# Patient Record
Sex: MALE | Race: WHITE | Employment: FULL TIME | URBAN - METROPOLITAN AREA
[De-identification: names, ages, dates, MRNs, and addresses within clinical notes are randomized per-mention and may not be internally consistent; named-entity substitution may affect disease eponyms.]

---

## 2017-06-19 ENCOUNTER — OFFICE VISIT (OUTPATIENT)
Dept: DERMATOLOGY | Age: 52
End: 2017-06-19

## 2017-06-19 DIAGNOSIS — D22.9 MULTIPLE NEVI: ICD-10-CM

## 2017-06-19 DIAGNOSIS — L57.0 AK (ACTINIC KERATOSIS): Primary | ICD-10-CM

## 2017-06-19 DIAGNOSIS — Z85.828 HISTORY OF BASAL CELL CARCINOMA: ICD-10-CM

## 2017-06-19 DIAGNOSIS — L82.1 SK (SEBORRHEIC KERATOSIS): ICD-10-CM

## 2017-06-19 PROCEDURE — 99213 OFFICE O/P EST LOW 20 MIN: CPT | Performed by: DERMATOLOGY

## 2017-06-19 PROCEDURE — 17000 DESTRUCT PREMALG LESION: CPT | Performed by: DERMATOLOGY

## 2017-06-21 RX ORDER — ATORVASTATIN CALCIUM 20 MG/1
TABLET, FILM COATED ORAL
Qty: 90 TABLET | Refills: 3 | Status: SHIPPED | OUTPATIENT
Start: 2017-06-21 | End: 2018-07-15 | Stop reason: SDUPTHER

## 2017-12-21 ENCOUNTER — TELEPHONE (OUTPATIENT)
Dept: INTERNAL MEDICINE | Age: 52
End: 2017-12-21

## 2017-12-21 NOTE — TELEPHONE ENCOUNTER
He is sttarting with a headache and body ache sore throat. ..   His wife is on Tamiflu   He wants tamiflu   RX# [phone]

## 2018-03-26 ENCOUNTER — TELEPHONE (OUTPATIENT)
Dept: INTERNAL MEDICINE | Age: 53
End: 2018-03-26

## 2018-07-15 RX ORDER — ATORVASTATIN CALCIUM 20 MG/1
TABLET, FILM COATED ORAL
Qty: 90 TABLET | Refills: 3 | Status: SHIPPED | OUTPATIENT
Start: 2018-07-15 | End: 2021-03-11

## 2018-08-23 ENCOUNTER — TELEPHONE (OUTPATIENT)
Dept: INTERNAL MEDICINE | Age: 53
End: 2018-08-23

## 2018-08-23 NOTE — TELEPHONE ENCOUNTER
For about 9 day he has had loose bm's cramping, headache, sore throat and some chest pains  No real fever that he knows of

## 2018-09-11 ENCOUNTER — OFFICE VISIT (OUTPATIENT)
Dept: DERMATOLOGY | Age: 53
End: 2018-09-11

## 2018-09-11 DIAGNOSIS — L82.1 SK (SEBORRHEIC KERATOSIS): ICD-10-CM

## 2018-09-11 DIAGNOSIS — Z85.828 HISTORY OF BASAL CELL CARCINOMA: ICD-10-CM

## 2018-09-11 DIAGNOSIS — D48.5 NEOPLASM OF UNCERTAIN BEHAVIOR OF SKIN: Primary | ICD-10-CM

## 2018-09-11 PROCEDURE — 11100 PR BIOPSY OF SKIN LESION: CPT | Performed by: DERMATOLOGY

## 2018-09-11 PROCEDURE — 99213 OFFICE O/P EST LOW 20 MIN: CPT | Performed by: DERMATOLOGY

## 2018-09-11 PROCEDURE — 11101 PR BIOPSY, EACH ADDED LESION: CPT | Performed by: DERMATOLOGY

## 2018-09-11 NOTE — PROGRESS NOTES
[facility]  Massimo Ortega MD  [phone]      [facility]  1965    52 y.o. male     Date of Visit: 9/11/2018    Chief Complaint: skin lesions    History of Present Illness:    1. He returns today for a full skin examination. He complains of a persistent lesion below the right lower eyelid. He also complains of a lesion on the right side of the nose that bled recently. 2.  He also complains of several pigmented lesions on the forehead, left lower cheek and also on the chest.    3.  He has a history of BCCsdenies any signs of recurrence. Small nodular BCC of the right postauricular regiontreated with electrodesiccation and curettage on 1/15/2016. BCC of the right mid browtreated with Mohs by Dr. Nacho Crabtree on 7/16/2015. Basal cell carcinoma on the right nasal sidewall status post Mohs micrographic surgery with Dr. Nacho Crabtree on 7/14/2014  Basal cell carcinoma on the left temple status post Mohs micrographic surgery in March 2013. Review of Systems:  Skin: No new or changing moles. Past Medical History, Family History, Surgical History, Medications and Allergies reviewed. Past Medical History:   Diagnosis Date    Allergic rhinitis     Hyperlipidemia     Squamous cell carcinoma July, 2014    Tk Serrato     Past Surgical History:   Procedure Laterality Date    COLONOSCOPY  May 31, 2013 ( 2023 )     - Normal    MOHS SURGERY  July, 2014    Dr. Atul Beach - squamous cell carcinoma    SKIN BIOPSY      pre-cancerous       No Known Allergies  Outpatient Prescriptions Marked as Taking for the 9/11/18 encounter (Office Visit) with Jose Maria Doan MD   Medication Sig Dispense Refill    atorvastatin (LIPITOR) 20 MG tablet TAKE 1 TABLET DAILY 90 tablet 3    multivitamin (THERAGRAN) per tablet Take 1 tablet by mouth daily.  Misc Natural Products (GLUCOSAMINE CHOND COMPLEX/MSM PO) Take  by mouth.  FISH OIL Take  by mouth daily.      

## 2018-09-11 NOTE — PATIENT INSTRUCTIONS

## 2018-09-17 LAB — DERMATOLOGY PATHOLOGY REPORT: ABNORMAL

## 2018-09-18 DIAGNOSIS — C44.91 BASAL CELL CARCINOMA, UNSPECIFIED SITE: Primary | ICD-10-CM

## 2018-10-25 ENCOUNTER — PROCEDURE VISIT (OUTPATIENT)
Dept: SURGERY | Age: 53
End: 2018-10-25
Payer: COMMERCIAL

## 2018-10-25 VITALS
BODY MASS INDEX: 29.91 KG/M2 | SYSTOLIC BLOOD PRESSURE: 144 MMHG | OXYGEN SATURATION: 96 % | TEMPERATURE: 97.4 F | WEIGHT: 191 LBS | HEART RATE: 55 BPM | DIASTOLIC BLOOD PRESSURE: 84 MMHG

## 2018-10-25 DIAGNOSIS — C44.311 BASAL CELL CARCINOMA OF RIGHT NASAL SIDEWALL: Primary | ICD-10-CM

## 2018-10-25 PROCEDURE — 13151 CMPLX RPR E/N/E/L 1.1-2.5 CM: CPT | Performed by: DERMATOLOGY

## 2018-10-25 PROCEDURE — 17311 MOHS 1 STAGE H/N/HF/G: CPT | Performed by: DERMATOLOGY

## 2018-10-26 ENCOUNTER — TELEPHONE (OUTPATIENT)
Dept: SURGERY | Age: 53
End: 2018-10-26

## 2018-11-01 ENCOUNTER — NURSE ONLY (OUTPATIENT)
Dept: SURGERY | Age: 53
End: 2018-11-01

## 2018-11-01 DIAGNOSIS — Z48.02 VISIT FOR SUTURE REMOVAL: Primary | ICD-10-CM

## 2018-11-01 NOTE — PATIENT INSTRUCTIONS
[facility] Office Hours:    Monday-Thursday  7:30 AM-4:30 PM    Friday  9:00 AM-3:00 PM      Wound Care Massaging Instruction    Starting at approximately four weeks following surgery, we often ask that our patients begin massaging their wound on a regular basis. We suggest 5 minutes every morning and 5 minutes every night using an emollient such as Aquaphor, Vaseline or Eucerin cream.  The sutures that were placed underneath the surface of the skin take about 70 days to dissolve, and during that time, they can cause lumps along the line of the scar. These lumps will eventually go away on their own, but the use of regular massage will help speed the process as well as help soften the scar tissue more quickly. Please continue to use sunscreen, SPF 45 or higher during this time.

## 2018-11-29 ENCOUNTER — PROCEDURE VISIT (OUTPATIENT)
Dept: SURGERY | Age: 53
End: 2018-11-29
Payer: COMMERCIAL

## 2018-11-29 VITALS
BODY MASS INDEX: 29.91 KG/M2 | TEMPERATURE: 97.9 F | HEART RATE: 57 BPM | DIASTOLIC BLOOD PRESSURE: 90 MMHG | SYSTOLIC BLOOD PRESSURE: 137 MMHG | WEIGHT: 191 LBS | OXYGEN SATURATION: 98 %

## 2018-11-29 DIAGNOSIS — C44.111 BASAL CELL CARCINOMA (BCC) OF RIGHT EYELID: Primary | ICD-10-CM

## 2018-11-29 PROCEDURE — 17311 MOHS 1 STAGE H/N/HF/G: CPT | Performed by: DERMATOLOGY

## 2018-11-29 PROCEDURE — 17312 MOHS ADDL STAGE: CPT | Performed by: DERMATOLOGY

## 2018-11-29 NOTE — PROGRESS NOTES
the technique of Mohs. A map was prepared to correspond to the area of skin from which it was excised. Hemostasis was achieved using electrosurgery. The wound was bandaged. The tissue was prepared for the cryostat and sectioned. 1 section(s) prepared. Each section was coded, cut, and stained for microscopic examination. The entire base and margins of the excised piece of tissue were examined by the surgeon. Stage I: Nodular BCC: large basaloid lobules of varying shape and size with peripheral palisading present around the rim of the lobule, with retraction of the tumor lobules from their associated stroma. The remaining tumor was noted and the next stage was performed. Stage II:  A thin layer of tissue was removed at the histologically-identified sites of remaining tumor. The entire procedure as described in stage I was repeated to process the tissue according to Mohs technique. 1 section(s) prepared for stage II. No tumor was identified at the peripheral margins of stage II of microscopically controlled surgery. DEFECT MANAGEMENT:    REPAIR DESCRIPTION:    As had been arranged, the patient will be seen by Dr. Kimberly Del Valle, oculoplastics, for repair. WOUND COVERAGE:  The wound was cleaned with normal saline solution, dried off, Aquaphor ointment was applied, and the wound was covered. A dressing was applied for stabilization and light pressure. The patient was given detailed oral and written instructions on postoperative care. There were no complications. The patient left the Unit in good medical condition. FOLLOW-UP:  Follow up with gen derm as indicated.

## 2019-03-28 ENCOUNTER — OFFICE VISIT (OUTPATIENT)
Dept: DERMATOLOGY | Age: 54
End: 2019-03-28
Payer: COMMERCIAL

## 2019-03-28 DIAGNOSIS — L57.0 AK (ACTINIC KERATOSIS): ICD-10-CM

## 2019-03-28 DIAGNOSIS — C44.519 BCC (BASAL CELL CARCINOMA), TRUNK: Primary | ICD-10-CM

## 2019-03-28 DIAGNOSIS — Z85.828 HISTORY OF BASAL CELL CARCINOMA (BCC): ICD-10-CM

## 2019-03-28 PROCEDURE — 17260 DSTRJ MAL LES T/A/L 0.5 CM/<: CPT | Performed by: DERMATOLOGY

## 2019-03-28 PROCEDURE — 99213 OFFICE O/P EST LOW 20 MIN: CPT | Performed by: DERMATOLOGY

## 2019-03-28 PROCEDURE — 17000 DESTRUCT PREMALG LESION: CPT | Performed by: DERMATOLOGY

## 2019-03-28 NOTE — PATIENT INSTRUCTIONS

## 2019-03-28 NOTE — PROGRESS NOTES
[facility]  Darryle Dublin, MD  [phone]      Tremaine Richard  1965    52 y.o. male     Date of Visit: 3/28/2019    Chief Complaint: skin lesions    History of Present Illness:    1. Unknown duration of a persistent pink lesion on the left upper back. 2.  He complains of a persistent pink lesion on the left temple. 3.  He has a history of multiple BCCs, most recently 5 mos ago -     Nodular BCC on the right lower eyelid-treated with Mohs by Dr. Nuha Astorga and repair by Dr. Richie Díaz in November 2018. Nodular BCC of the right nasal sidewall-treated with Mohs by Dr. Nuha Astorga on 10/25/2018. Small nodular BCC of the right postauricular region-treated with electrodesiccation and curettage on 1/15/2016. BCC of the right mid brow-treated with Mohs by Dr. Nuha Astorga on 7/16/2015. Basal cell carcinoma on the right nasal sidewall status post Mohs micrographic surgery with Dr. Nuha Astorga on 7/14/2014  Basal cell carcinoma on the left temple status post Mohs micrographic surgery in March 2013. Review of Systems:  Skin: No new or changing moles. Past Medical History, Family History, Surgical History, Medications and Allergies reviewed. Past Medical History:   Diagnosis Date    Allergic rhinitis     Hyperlipidemia     Squamous cell carcinoma July, 2014    Tk [place] March     Past Surgical History:   Procedure Laterality Date    BLEPHAROPLASTY  *Nov.29, 2018    Dr. Lupis Carrillo - right lower lid    COLONOSCOPY  May 31, 2013 ( 2023 )     - Normal    MOHS SURGERY  July, 2014    Dr. Maeve Malone - squamous cell carcinoma    MOHS SURGERY Right 10/25/2018    right nasal sidewall    SKIN BIOPSY      pre-cancerous       No Known Allergies  Outpatient Medications Marked as Taking for the 3/28/19 encounter (Office Visit) with Darion Powell MD   Medication Sig Dispense Refill    atorvastatin (LIPITOR) 20 MG tablet TAKE 1 TABLET DAILY 90 tablet 3    triamcinolone (KENALOG) 0.1 % cream Apply to the affected area on the neck twice daily for up to 2 weeks or until improved. 45 g 1    multivitamin (THERAGRAN) per tablet Take 1 tablet by mouth daily.  Misc Natural Products (GLUCOSAMINE CHOND COMPLEX/MSM PO) Take  by mouth.  FISH OIL Take  by mouth daily.  cetirizine (ZYRTEC) 10 MG tablet Take 10 mg by mouth daily. Social History:  Occupation:   in [employer]      Physical Examination       The following were examined and determined to be normal: Psych/Neuro, Scalp/hair, Conjunctivae/eyelids, Gums/teeth/lips, Neck, Breast/axilla/chest, Abdomen, RUE, LUE, RLE, LLE and Nails/digits. The following were examined and determined to be abnormal: Head/face and Back. Well-appearing. 1.  Left upper back with a 4 mm round telangiectatic pink macule. 2.  Right temple with one scaly erythematous macule. 3.  Right nasal sidewall with a linear surgical scar. Right infraorbital region with a small surgical scar. Assessment and Plan     1. BCC (basal cell carcinoma), left upper back - 4 mm    Discussed likely diagnosis; patient agreeable to biopsy and curettage (verbal consent obtained). The area(s) to be biopsied were marked with a surgical pen. Alcohol was used to cleanse the site. Local anesthesia was acheived with 1% lidocaine with epinephrine. Shave biopsy was performed using a razor blade followed by sharp curettage in multiple directions. Hemostasis was achieved with aluminum chloride. The wound(s) were dressed with petrolatum and covered with a bandage. Wound care instructions were reviewed. 1 Specimen (s) sent to pathology. The specimen bottles were appropriately labeled. We also reviewed the risks of bleeding, scar, and infection. 2. AK (actinic keratosis) -     2 cycles of liquid nitrogen applied to 1 AK on the left temple.  Patient was educated regarding the potential risks of blister formation, discomfort, hypopigmentation, and scar. Wound care was discussed. 3. History of basal cell carcinoma (BCC) - clear    Sun protective behaviors and self skin examinations were encouraged. Call for any new or concerning lesions. Return in about 6 months (around 9/28/2019).

## 2019-04-02 LAB — DERMATOLOGY PATHOLOGY REPORT: ABNORMAL

## 2019-08-06 ENCOUNTER — OFFICE VISIT (OUTPATIENT)
Dept: DERMATOLOGY | Age: 54
End: 2019-08-06
Payer: COMMERCIAL

## 2019-08-06 DIAGNOSIS — L57.0 AK (ACTINIC KERATOSIS): ICD-10-CM

## 2019-08-06 DIAGNOSIS — L82.1 SK (SEBORRHEIC KERATOSIS): Primary | ICD-10-CM

## 2019-08-06 DIAGNOSIS — L73.8 SEBACEOUS GLAND HYPERPLASIA OF FACE: ICD-10-CM

## 2019-08-06 DIAGNOSIS — Z85.828 HISTORY OF BASAL CELL CARCINOMA (BCC): ICD-10-CM

## 2019-08-06 PROCEDURE — 99213 OFFICE O/P EST LOW 20 MIN: CPT | Performed by: DERMATOLOGY

## 2020-01-21 ENCOUNTER — TELEPHONE (OUTPATIENT)
Dept: DERMATOLOGY | Age: 55
End: 2020-01-21

## 2020-01-21 NOTE — TELEPHONE ENCOUNTER
Voicemail received 1/21/20 at 8:31 am.    Patient had to cancel his appointment with Dr. Juan Blackwell on 2/5/20. Would like someone to call him back to reschedule.     [phone]

## 2020-03-12 ENCOUNTER — OFFICE VISIT (OUTPATIENT)
Dept: DERMATOLOGY | Age: 55
End: 2020-03-12
Payer: COMMERCIAL

## 2020-03-12 PROCEDURE — 99213 OFFICE O/P EST LOW 20 MIN: CPT | Performed by: DERMATOLOGY

## 2020-03-12 NOTE — PROGRESS NOTES
Taking for the 3/12/20 encounter (Office Visit) with Lennox Eaves, MD   Medication Sig Dispense Refill    multivitamin [facility]) per tablet Take 1 tablet by mouth daily.  Misc Natural Products (GLUCOSAMINE CHOND COMPLEX/MSM PO) Take  by mouth.  FISH OIL Take  by mouth daily. Physical Examination       The following were examined and determined to be normal: Psych/Neuro, Scalp/hair, Conjunctivae/eyelids, Gums/teeth/lips, Neck, Breast/axilla/chest, Abdomen, Back, RUE, LUE, RLE, LLE and Nails/digits. The following were examined and determined to be abnormal: Head/face. Well-appearing. 1.  Left temple with 1 scaly pink macule. 2.  Scattered on the trunk and to lesser extent the extremities are well-defined round oval uniformly brown macules and few papules. 3.  Clear. Assessment and Plan     1. Actinic keratosis - 1 small and asymptomatic     Observe. Continue sun protection. 2. Multiple nevi - benign appearing    Sun protective behaviors and self skin examinations were encouraged. Call for any new or concerning lesions. 3. History of basal cell carcinoma (BCC) - clear    Sun protective behaviors and self skin examinations were encouraged. Call for any new or concerning lesions. Return in about 6 months (around 9/12/2020).

## 2020-09-10 ENCOUNTER — OFFICE VISIT (OUTPATIENT)
Dept: DERMATOLOGY | Age: 55
End: 2020-09-10
Payer: COMMERCIAL

## 2020-09-10 VITALS — TEMPERATURE: 97.5 F

## 2020-09-10 PROCEDURE — 17000 DESTRUCT PREMALG LESION: CPT | Performed by: DERMATOLOGY

## 2020-09-10 PROCEDURE — 99213 OFFICE O/P EST LOW 20 MIN: CPT | Performed by: DERMATOLOGY

## 2020-09-10 NOTE — PROGRESS NOTES
[facility]  Darion Guzman MD  [phone]      Luiza Neves  1965    60 y.o. male     Date of Visit: 9/10/2020    Chief Complaint: skin lesions    History of Present Illness:    1. Unknown duration of a persistent slightly irritated lesion on the right cheek. 2.  He also complains of a persistent scaly lesion on the left lateral cheek. 3.  He has multiple moles on the trunk and extremities-not aware of any changes in size, color, shape. 4.  He has a history of multiple BCCs-denies any signs of recurrence. Small nodular BCC on the left upper back-treated with curettage at the time of biopsy on 3/28/2019. Nodular BCC on the right lower eyelid-treated with Mohs by Dr. Live Calzada and repair by Dr. Kurt Serrano in November 2018. Nodular BCC of the right nasal sidewall-treated with Mohs by Dr. Live Calzada on 10/25/2018. Small nodular BCC of the right postauricular region-treated with electrodesiccation and curettage on 1/15/2016. BCC of the right mid brow-treated with Mohs by Dr. Live Calzada on 7/16/2015. Basal cell carcinoma on the right nasal sidewall status post Mohs micrographic surgery with Dr. Live Calzada on 7/14/2014  Basal cell carcinoma on the left temple status post Mohs micrographic surgery in March 2013.       Review of Systems:  Skin: No rash or other concerning skin lesions. Past Medical History, Family History, Surgical History, Medications and Allergies reviewed. Past Medical History:   Diagnosis Date    Allergic rhinitis     Hyperlipidemia     Squamous cell carcinoma July, 2014    Tk Romero     Past Surgical History:   Procedure Laterality Date    BLEPHAROPLASTY  *Nov.29, 2018    Dr. Ella Jaeger - right lower lid    COLONOSCOPY  May 31, 2013 ( 2023 )     - Normal    KNEE ARTHROPLASTY Right 02/2020   [facility] SURGERY  July, 2014    Dr. Guilherme Perry - squamous cell carcinoma    MOHS SURGERY Right 10/25/2018    right nasal sidewall    SKIN BIOPSY pre-cancerous       No Known Allergies  Outpatient Medications Marked as Taking for the 9/10/20 encounter (Office Visit) with Rinku Young MD   Medication Sig Dispense Refill    multivitamin [facility]) per tablet Take 1 tablet by mouth daily.  Misc Natural Products (GLUCOSAMINE CHOND COMPLEX/MSM PO) Take  by mouth.  FISH OIL Take  by mouth daily. Physical Examination       The following were examined and determined to be normal: Psych/Neuro, Scalp/hair, Conjunctivae/eyelids, Gums/teeth/lips, Neck, Breast/axilla/chest, Abdomen, Back, RUE, LUE, RLE, LLE and Nails/digits. The following were examined and determined to be abnormal: Head/face. Well-appearing. 1.  Right malar cheek with a stuck on appearing verrucous pink papule. 2.  Left lateral cheek with a keratotic erythematous macule. 3.  Scattered on the trunk and extremities are multiple well-defined round of uniformly brown macules and few papules. 4.  Clear. Assessment and Plan     1. SK (seborrheic keratosis)     Reassurance. 2. AK (actinic keratosis) - 1    2 cycles of liquid nitrogen applied to 1 AK on the left lateral cheek. Patient was educated regarding the potential risks of blister formation, discomfort, hypopigmentation, and scar. Wound care was discussed. 3. Multiple nevi - benign appearing    Sun protective behaviors and self skin examinations were encouraged. Call for any new or concerning lesions. 4. History of basal cell carcinomas - clear    Sun protective behaviors and self skin examinations were encouraged. Call for any new or concerning lesions. Return in about 6 months (around 3/10/2021).

## 2021-03-11 ENCOUNTER — OFFICE VISIT (OUTPATIENT)
Dept: DERMATOLOGY | Age: 56
End: 2021-03-11
Payer: COMMERCIAL

## 2021-03-11 VITALS — TEMPERATURE: 97.2 F

## 2021-03-11 DIAGNOSIS — D48.5 NEOPLASM OF UNCERTAIN BEHAVIOR OF SKIN: Primary | ICD-10-CM

## 2021-03-11 DIAGNOSIS — L57.0 AK (ACTINIC KERATOSIS): ICD-10-CM

## 2021-03-11 DIAGNOSIS — L82.1 SK (SEBORRHEIC KERATOSIS): ICD-10-CM

## 2021-03-11 DIAGNOSIS — Z85.828 HISTORY OF BASAL CELL CARCINOMA: ICD-10-CM

## 2021-03-11 PROCEDURE — 11102 TANGNTL BX SKIN SINGLE LES: CPT | Performed by: DERMATOLOGY

## 2021-03-11 PROCEDURE — 99213 OFFICE O/P EST LOW 20 MIN: CPT | Performed by: DERMATOLOGY

## 2021-03-11 NOTE — PROGRESS NOTES
[facility]  Gigi Resendez MD  [phone]      Yonathan Lane  1965    60 y.o. male     Date of Visit: 3/11/2021    Chief Complaint: skin lesions    History of Present Illness:    1. He complains of about a 1 week history of a persistent lesion on the right lower forehead. 2.  He also complains of a raised lesion on the left shoulder. 3.  Follow-up for history of actinic keratoses-not aware of any new concerning lesions. 4.  He has a history of multiple BCCs-denies any signs of recurrence. Small nodular BCC on the left upper back-treated with curettage at the time of biopsy on 3/28/2019. Nodular BCC on the right lower eyelid-treated with Mohs by Dr. Mary Garcia and repair by Dr. Tana North in November 2018. Nodular BCC of the right nasal sidewall-treated with Mohs by Dr. Mary Garcia on 10/25/2018. Small nodular BCC of the right postauricular region-treated with electrodesiccation and curettage on 1/15/2016. BCC of the right mid brow-treated with Mohs by Dr. Mary Garcia on 7/16/2015. Basal cell carcinoma on the right nasal sidewall status post Mohs micrographic surgery with Dr. Mary Garcia on 7/14/2014  Basal cell carcinoma on the left temple status post Mohs micrographic surgery in March 2013.       Review of Systems:  Gen: Feels well, good sense of health. Past Medical History, Family History, Surgical History, Medications and Allergies reviewed. Past Medical History:   Diagnosis Date    Allergic rhinitis     Hyperlipidemia     Squamous cell carcinoma July, 2014    Tk Malone     Past Surgical History:   Procedure Laterality Date    BLEPHAROPLASTY  *Nov.29, 2018    Dr. Sergio Cruz - right lower lid    COLONOSCOPY  May 31, 2013 ( 2023 )     - Normal    KNEE ARTHROPLASTY Right 02/2020   [facility] SURGERY  July, 2014    Dr. iWnifred Bernard - squamous cell carcinoma    MOHS SURGERY Right 10/25/2018    right nasal sidewall    SKIN BIOPSY      pre-cancerous       No Known Allergies  Outpatient Medications Marked as Taking for the 3/11/21 encounter (Office Visit) with Chinedu Argueta MD   Medication Sig Dispense Refill    multivitamin [facility]) per tablet Take 1 tablet by mouth daily.  Misc Natural Products (GLUCOSAMINE CHOND COMPLEX/MSM PO) Take  by mouth.  FISH OIL Take  by mouth daily. Physical Examination       The following were examined and determined to be normal: Psych/Neuro, Scalp/hair, Conjunctivae/eyelids, Gums/teeth/lips, Neck, Breast/axilla/chest, Abdomen, Back, RUE, LUE, RLE, LLE and Nails/digits. The following were examined and determined to be abnormal: Head/face. Well-appearing. 1.  Right lower forehead with a 4 mm telangiectatic pearly papule. 2.  Left anterior shoulder with a stuck on appearing oval-shaped verrucous tan papule. 3.  Lateral portion of the forehead and temple with few skin colored pink scaly macules. 4.  Clear. Assessment and Plan     1. Neoplasm of uncertain behavior of skin, right lower forehead-rule out [address]    Discussed possible diagnosis; patient agreeable to biopsy (verbal consent obtained). The area(s) to be biopsied were marked with a surgical pen. Alcohol was used to cleanse the site. Local anesthesia was acheived with 1% lidocaine with epinephrine. Shave biopsy was performed using a razor blade. Hemostasis was achieved with aluminum chloride. The wound(s) were dressed with petrolatum and covered with a bandage. Wound care instructions were reviewed. 1 Specimen (s) sent to pathology. The specimen bottles were appropriately labeled. We also reviewed the risks of bleeding, scar, and infection. 2. SK (seborrheic keratosis)     Reassurance.      3. AK (actinic keratosis) - few, small and asymptomatic    Continue sun protective behaviors including use of at least SPF 30 sunscreen and a hat.     4. History of basal cell carcinomas - clear    Sun protective behaviors, including

## 2021-03-15 LAB — DERMATOLOGY PATHOLOGY REPORT: ABNORMAL

## 2021-03-16 ENCOUNTER — TELEPHONE (OUTPATIENT)
Dept: DERMATOLOGY | Age: 56
End: 2021-03-16

## 2021-03-16 DIAGNOSIS — C44.319 BASAL CELL CARCINOMA (BCC) OF RIGHT FOREHEAD: Primary | ICD-10-CM

## 2021-03-16 NOTE — TELEPHONE ENCOUNTER
Informed patient of biopsy results. Will place referral for Moh's surgery to Dr. Mitzi Olivares. Patient verbalized understanding.

## 2021-03-16 NOTE — TELEPHONE ENCOUNTER
Dr Marisol Byrne patient  Pt c/b #[phone]  Pt stated  Returning call for biopsy results  Please c/b to discuss

## 2021-05-11 ENCOUNTER — PROCEDURE VISIT (OUTPATIENT)
Dept: SURGERY | Age: 56
End: 2021-05-11
Payer: COMMERCIAL

## 2021-05-11 VITALS — HEART RATE: 63 BPM | SYSTOLIC BLOOD PRESSURE: 129 MMHG | TEMPERATURE: 97.3 F | DIASTOLIC BLOOD PRESSURE: 85 MMHG

## 2021-05-11 DIAGNOSIS — C44.319 BASAL CELL CARCINOMA (BCC) OF RIGHT FOREHEAD: Primary | ICD-10-CM

## 2021-05-11 PROCEDURE — 17312 MOHS ADDL STAGE: CPT | Performed by: DERMATOLOGY

## 2021-05-11 PROCEDURE — 12051 INTMD RPR FACE/MM 2.5 CM/<: CPT | Performed by: DERMATOLOGY

## 2021-05-11 PROCEDURE — 17311 MOHS 1 STAGE H/N/HF/G: CPT | Performed by: DERMATOLOGY

## 2021-05-11 RX ORDER — ATORVASTATIN CALCIUM 10 MG/1
10 TABLET, FILM COATED ORAL DAILY
COMMUNITY

## 2021-05-11 NOTE — PROGRESS NOTES
PRE-PROCEDURE SCREENING    Pacemaker/ICD: No  Difficulty with numbing in the past: No  Local Anesthesia Reaction/passing out: No  Latex or adhesive allergy:  No  Bleeding/Clotting Disorders: No  Anticoagulant Therapy: No  Joint prosthesis: No  Artificial Heart Valve: No  Stroke or Seizures: No  Organ Transplant or Lymphoma: No  Immunosuppression: No  Respiratory Problems:Environmental Allergies

## 2021-05-11 NOTE — PATIENT INSTRUCTIONS
[facility] Office Hours:    Monday-Thursday  7:30 AM-4:30 PM    Friday  9:00 AM-1:00 PM    POST-OPERATIVE CARE FOR DISSOLVING STICHES  Bandage change after 48 hours    During your procedure today, dissolving sutures, or stiches, were used to close the wound. You do not have to have stiches removed. They will dissolve (melt away) on their own. Please follow these instructions to help you recover from your procedure and help your wound heal.    CARING FOR YOUR SURGICAL SITE  The bandage should remain on and completely dry for 48 hours. Do NOT get the bandage wet. 1. After the first 48 hours, gently remove the remaining part of the bandage. It can be helpful to moisten the bandage edges in the shower. Steri strips may still be on the wound. It is ok, they will fall off slowly with the daily bandage changes. 2. Gently clean AROUND the wound daily with mild soap and water. Please avoid the area from getting wet. The more moisture is on the sutures the quicker they will dissolve. 3. Dry (pat) the area with a clean Q-tip or gauze. Try to clean off any debris or crust from the area. 4. Apply a layer of Vaseline/ Aquaphor (or Bacitracin if your doctor recommends) to the wound area only. 5. Cut a piece of Telfa (or any non-stick dressing) to fit just over the wound and secure it with paper tape. If the wound is small you may use a Band- Aid. Keep area covered for a total of 1 week(s). If the dressing comes off or if you have questions, or concerns about the dressing, please call the office for instructions! POST OPERATIVE INSTRUCTIONS    1. Activity: Do not lift anything heavier than a gallon of milk for 1 week. Also, avoid strenuous activity such as running, power walking or contact sports. 2. Eating and drinking: Do not drink alcohol for 48 hours after your procedure. Alcohol increases the chances of bleeding.   3. Medicines   -If you have discomfort, take Acetaminophen (Tylenol or Extra Strength Tylenol). Follow the instructions and warning on the bottle. -If your doctor has prescribed you an Aspirin daily, please keep taking it. Do not take extra Aspirin or medicines containing Aspirin (such as [place] and Excedrin) for 48 hours after your procedure. Bleeding: If bleeding occurs, DO NOT remove the bandage. Put firm pressure on the area with gauze for 20 minutes without peeking. If the bleeding continues, apply pressure for another 20 minutes. If the bleeding does not stop after you apply pressure, call us right away. If you can not call, go to the nearest emergency room or urgent care facility. What to expect:  You may have these symptoms. They are normal and should get better with time:  1. Swelling. Swelling usually increases for the first 48 hours after your procedure and then begins to improve. Some soreness and redness around your wound. If we worked close to your eyes (forehead, nose, temple, or upper cheeks) your eyes may become swollen and/ or black and blue. 2. Bruising, which could last 1 week or more. 3. Pink and bumpy appearance to the scar. This may happen a few weeks after your procedure. After 4 weeks, you may gently massage the area each day with facial moisturizer or petroleum jelly (Vaseline or Aquaphor). This will help to smooth the skin and improve the appearance of the scar. The color of your scar will fade over time, but it may be pink for several months after the procedure. The scar may take 6 months to 1 year to reach its final color and appearance. 4. \"Spitting\" suture. Occasionally, an inside suture (stitch) does not completely dissolve. When this happens, (generally 4-8 weeks after surgery), it causes a bump or \"pimple\" to form on the scar. This is easily removed and is not at all serious. It does not mean the skin cancer has returned. Contact us if it happens, but do not be alarmed. Vitamin E oil is NOT necessary.  A good moisturizer is just as effective. Sunscreen IS necessary. Use at least and SPF 30 sunscreen daily- even in winter    Call us at [phone] right away if you have any of the following symptoms:  -Bleeding that you can not stop (see highlighted area above). -Pain that lasts longer than 48 hours.  -Your wound becomes more painful, red or hot. -Bruising and swelling that does not begin to improve within the 48 hours or gets worse suddenly.

## 2021-05-11 NOTE — PROGRESS NOTES
MOHS PROCEDURE NOTE    PHYSICIAN:  Sienna Wyatt. Leila Mcknight MD    ASSISTANT: Carlos Cortez, RN, Donavon Rivera, RN     REFERRING PROVIDER:   Valdemar Benson MD    PREOPERATIVE DIAGNOSIS:Superficial Nodular Basal Cell Carcinoma     SPECIFIC MOHS INDICATIONS:  location    AUC SCORIN/9    POSTOPERATIVE DIAGNOSIS: SAME    LOCATION: Right forehead    OPERATIVE PROCEDURE:  MOHS MICROGRAPHIC SURGERY    RECONSTRUCTION OF DEFECT: Intermediate layered closure    PREOPERATIVE SIZE: 6x5 MM    DEFECT SIZE: 11x10 MM    LENGTH OF REPAIRED WOUND/SIZE OF FLAP/SIZE OF GRAFT:  25 MM    ANESTHESIA:  3.5mL 1% lidocaine with epinephrine 1:100,000 buffered. EBL:  MINIMAL    DURATION OF PROCEDURE:  2.5 HOURS    POSTOPERATIVE OBSERVATION: 1 HOUR    SPECIMENS:  SEE MOHS MAP    COMPLICATIONS:  NONE    DESCRIPTION OF PROCEDURE:  The patient was given a mirror, as appropriate, and the biopsy site was identified, marked with a surgical marking pen, and verified by the patient. Options for treatment were discussed and the patient was informed that Mohs surgery was the selected treatment based on its lower recurrence rate, given the features listed above, as compared to other treatment modalities such as excision, radiation, or curettage, and agreed with this treatment plan. Risks and benefits including bruising, swelling, bleeding, infection, nerve injury, recurrence, and scarring were discussed with the patient prior to the procedure and a written consent detailing these and other risks was reviewed with the patient and signed. There was a time out for person and procedure verification. The surgical site was prepped with an antiseptic solution. Application of an antiseptic solution was repeated before each surgical stage. Stage I:  The clinically-apparent tumor was carefully defined and debulked, determining the edge of the surgical excision.     A thin layer of tumor-laden tissue was excised with a narrow margin of normal-appearing skin, using the technique of Mohs. A map was prepared to correspond to the area of skin from which it was excised. Hemostasis was achieved using electrosurgery. The wound was bandaged. The tissue was prepared for the cryostat and sectioned. 1 section(s) prepared. Each section was coded, cut, and stained for microscopic examination. The entire base and margins of the excised piece of tissue were examined by the surgeon. The tissue was examined to the level of subcut fat. Stage I, II:  Superficial BCC: isolated basaloid lobules projecting from the lower margin of the epidermis. The remaining tumor was noted and the next stage was performed. Stage II, III:  A thin layer of tissue was removed at the histologically-identified sites of remaining tumor. The entire procedure as described in stage I was repeated to process the tissue according to Mohs technique. 1 section(s) prepared for stage  II. No tumor was identified at the peripheral margins of stage II of microscopically controlled surgery. DEFECT MANAGEMENT:    REPAIR DESCRIPTION:  Various closure modalities were discussed with the patient, and it was decided that an intermediate layered repair would best preserve normal anatomic and functional relationships. Additional risk of wound dehiscence was discussed. The area was anesthetized with 1% lidocaine with epinephrine 1:100,000 buffered, was given a sterile prep using Chlorhexidine gluconate 4% solution and draped in the usual sterile fashion. Recreation and enlargement of the wound was performed by excising cones of tissue via the triangulation technique. The final incision lines were placed with respect for the patient's natural skin tension lines in a linear configuration to avoid functional and aesthetic distortion of adjacent free margins. Following minimal undermining, meticulous hemostasis was obtained with spot monopolar electrocoagulation.   Subcutaneous dead space and dermis were closed using 5-0 Vicryl buried subcutaneous interrupted suture and the epidermis was approximated with 5-0 Fast absorbing gut running epidermal sutures. WOUND COVERAGE:  The wound was cleaned with normal saline solution, dried off, Aquaphor ointment was applied, and the wound was covered. A dressing was applied for stabilization and light pressure. The patient was given detailed oral and written instructions on postoperative care. There were no complications. The patient left the Unit in good medical condition. FOLLOW-UP:  As dissolving sutures were placed, the patient was asked to return if any questions or concerns arose, but otherwise will return to see general dermatology per their instructions.

## 2021-05-12 ENCOUNTER — TELEPHONE (OUTPATIENT)
Dept: SURGERY | Age: 56
End: 2021-05-12

## 2021-05-12 NOTE — TELEPHONE ENCOUNTER
The patient was in the office on right forehead for mohs located on the right forehead with ILC repair. The patient tolerated the procedure well and left the office in good condition. Pain level on post-operative day 1:  Headaches- adequately treated    Any bleeding episode that required pressure to be held, bandage change or a call to the office or MD?  no     Any other issues?:  no    A post-operative telephone call was placed at 11:51am in order to check on the patient's recovery process. The patient reported doing well and had no complaints other than those listed above, if any. All of the patient's questions were answered.

## 2021-09-13 ENCOUNTER — OFFICE VISIT (OUTPATIENT)
Dept: DERMATOLOGY | Age: 56
End: 2021-09-13
Payer: COMMERCIAL

## 2021-09-13 VITALS — TEMPERATURE: 97.3 F

## 2021-09-13 DIAGNOSIS — L57.0 AK (ACTINIC KERATOSIS): ICD-10-CM

## 2021-09-13 DIAGNOSIS — L82.1 SK (SEBORRHEIC KERATOSIS): Primary | ICD-10-CM

## 2021-09-13 DIAGNOSIS — Z85.828 HISTORY OF BASAL CELL CARCINOMA: ICD-10-CM

## 2021-09-13 PROCEDURE — 99212 OFFICE O/P EST SF 10 MIN: CPT | Performed by: DERMATOLOGY

## 2021-09-13 PROCEDURE — 17000 DESTRUCT PREMALG LESION: CPT | Performed by: DERMATOLOGY

## 2021-09-13 NOTE — PROGRESS NOTES
[facility]  Lisa Simpson MD  [phone]      Eduarda Santillan  1965    60 y.o. male     Date of Visit: 9/13/2021    Chief Complaint: skin lesions    History of Present Illness:    1. He complains of a couple of asymptomatic lesions on the right frontal scalp and left lower cheek. 2.  He also complains of a persistent scaly lesion on the central upper forehead. 3.  He has a history of multiple BCCs-denies any signs of recurrence. Superficial nodular BCC of the right forehead-treated with Mohs by Dr. Maria C Mckeon on 5/11/2021. Small nodular BCC on the left upper back-treated with curettage at the time of biopsy on 3/28/2019. Nodular BCC on the right lower eyelid-treated with Mohs by Dr. Maria C Mckeon and repair by Dr. Ace Colin in November 2018. Nodular BCC of the right nasal sidewall-treated with Mohs by Dr. Maria C Mckeon on 10/25/2018. Small nodular BCC of the right postauricular region-treated with electrodesiccation and curettage on 1/15/2016. BCC of the right mid brow-treated with Mohs by Dr. Maria C Mckeon on 7/16/2015. Basal cell carcinoma on the right nasal sidewall status post Mohs micrographic surgery with Dr. Maria C Mckeon on 7/14/2014  Basal cell carcinoma on the left temple status post Mohs micrographic surgery in March 2013.     Review of Systems:  Gen: Feels well, good sense of health. Past Medical History, Family History, Surgical History, Medications and Allergies reviewed. Past Medical History:   Diagnosis Date    Allergic rhinitis     Basal cell carcinoma (BCC) of right forehead 05/2021    NBCC, superficial, RT forehead    Hyperlipidemia     Squamous cell carcinoma July, 2014    Tk Mejía     Past Surgical History:   Procedure Laterality Date    BLEPHAROPLASTY  *Nov.29, 2018    Dr. Bong Puentes - right lower lid    COLONOSCOPY  May 31, 2013 ( 2023 )     - Normal    KNEE ARTHROPLASTY Right 02/2020   [facility] SURGERY  July, 2014    Dr. Julissa Barrios - squamous

## 2022-03-21 ENCOUNTER — OFFICE VISIT (OUTPATIENT)
Dept: DERMATOLOGY | Age: 57
End: 2022-03-21
Payer: COMMERCIAL

## 2022-03-21 VITALS — TEMPERATURE: 96.6 F

## 2022-03-21 DIAGNOSIS — Z85.828 HISTORY OF BASAL CELL CARCINOMA: ICD-10-CM

## 2022-03-21 DIAGNOSIS — D22.9 MULTIPLE NEVI: Primary | ICD-10-CM

## 2022-03-21 PROCEDURE — 99212 OFFICE O/P EST SF 10 MIN: CPT | Performed by: DERMATOLOGY

## 2022-03-21 NOTE — PROGRESS NOTES
[facility]  Joshua Jha MD  [phone]      David Harris  1965    60 y.o. male     Date of Visit: 3/21/2022    Chief Complaint: skin lesions    History of Present Illness:    1. He presents today for evaluation of multiple moles on the trunk and extremities-not aware of any changes in size, color, or shape. 2.  He has a history of multiple BCCs-not aware of any signs of recurrence. Superficial nodular BCC of the right forehead-treated with Mohs by Dr. Valarie Womack on 5/11/2021. Small nodular BCC on the left upper back-treated with curettage at the time of biopsy on 3/28/2019. Nodular BCC on the right lower eyelid-treated with Mohs by Dr. Valarie Womack and repair by Dr. Indu Easley in November 2018. Nodular BCC of the right nasal sidewall-treated with Mohs by Dr. Valarie Womack on 10/25/2018. Small nodular BCC of the right postauricular region-treated with electrodesiccation and curettage on 1/15/2016. BCC of the right mid brow-treated with Mohs by Dr. Valarie Womack on 7/16/2015. Basal cell carcinoma on the right nasal sidewall status post Mohs micrographic surgery with Dr. Valarie Womack on 7/14/2014  Basal cell carcinoma on the left temple status post Mohs micrographic surgery in March 2013. Review of Systems:  Gen: Feels well, good sense of health. Past Medical History, Family History, Surgical History, Medications and Allergies reviewed. Past Medical History:   Diagnosis Date    Allergic rhinitis     Basal cell carcinoma (BCC) of right forehead 05/2021    NBCC, superficial, RT forehead    Hyperlipidemia     Squamous cell carcinoma July, 2014    Tk [place]     Past Surgical History:   Procedure Laterality Date    BLEPHAROPLASTY  *Nov.29, 2018    Dr. Nils Rizzo - right lower lid    COLONOSCOPY  May 31, 2013 ( 2023 )     - Normal    KNEE ARTHROPLASTY Right 02/2020   [facility] SURGERY  July, 2014    Dr. Monie Grayson - squamous cell carcinoma    MOHS SURGERY Right 10/25/2018    right nasal sidewall    SKIN BIOPSY      pre-cancerous       No Known Allergies  Outpatient Medications Marked as Taking for the 3/21/22 encounter (Office Visit) with Janneth Childs MD   Medication Sig Dispense Refill    atorvastatin (LIPITOR) 10 MG tablet Take 10 mg by mouth daily      multivitamin (THERAGRAN) per tablet Take 1 tablet by mouth daily.  Misc Natural Products (GLUCOSAMINE CHOND COMPLEX/MSM PO) Take  by mouth.  FISH OIL Take  by mouth daily. Has 3 kids: 1 in town, 2 out of town. Youngest daughter expecting a child in August.      Physical Examination       The following were examined and determined to be normal: Psych/Neuro, Scalp/hair, Head/face, Conjunctivae/eyelids, Gums/teeth/lips, Neck, Breast/axilla/chest, Abdomen, Back, RUE, LUE, RLE, LLE and Nails/digits. The following were examined and determined to be abnormal: None. Well-appearing. 1.  Scattered on the trunk and extremities are multiple well-defined round of uniformly brown macules and few papules. 2.  Clear. Assessment and Plan     1. Multiple nevi - benign appearing    Sun protective behaviors, including use of at least SPF 30 sunscreen, and self skin examinations were encouraged. Call for any new or concerning lesions. 2. History of multiple basal cell carcinomas - clear     Sun protective behaviors, including use of at least SPF 30 sunscreen, and self skin examinations were encouraged. Call for any new or concerning lesions. Return in about 6 months (around 9/21/2022).     --Janneth Childs MD

## 2022-12-15 ENCOUNTER — OFFICE VISIT (OUTPATIENT)
Dept: DERMATOLOGY | Age: 57
End: 2022-12-15
Payer: COMMERCIAL

## 2022-12-15 DIAGNOSIS — L57.0 AK (ACTINIC KERATOSIS): Primary | ICD-10-CM

## 2022-12-15 DIAGNOSIS — D22.9 MULTIPLE NEVI: ICD-10-CM

## 2022-12-15 PROCEDURE — 17003 DESTRUCT PREMALG LES 2-14: CPT | Performed by: DERMATOLOGY

## 2022-12-15 PROCEDURE — 17000 DESTRUCT PREMALG LESION: CPT | Performed by: DERMATOLOGY

## 2022-12-15 PROCEDURE — 99212 OFFICE O/P EST SF 10 MIN: CPT | Performed by: DERMATOLOGY

## 2022-12-15 RX ORDER — ATORVASTATIN CALCIUM 20 MG/1
TABLET, FILM COATED ORAL
COMMUNITY
Start: 2022-10-11

## 2022-12-15 NOTE — PROGRESS NOTES
[facility]  Denisse Garcia  1965    59 y.o. male     Date of Visit: 12/15/2022    Chief Complaint: skin lesions    History of Present Illness:    1. He has few new lesions on the scalp and a persistent scaly lesion on the left nasal sidewall. 2.  He has multiple moles on the trunk and extremities-not aware of any changes in size, color, or shape. Dermatologic history:    Superficial nodular BCC of the right forehead-treated with Mohs by Dr. Kristel Campa on 5/11/2021. Small nodular BCC on the left upper back-treated with curettage at the time of biopsy on 3/28/2019. Nodular BCC on the right lower eyelid-treated with Mohs by Dr. Kristel Campa and repair by Dr. Senia Santos in November 2018. Nodular BCC of the right nasal sidewall-treated with Mohs by Dr. Kristel Campa on 10/25/2018. Small nodular BCC of the right postauricular region-treated with electrodesiccation and curettage on 1/15/2016. BCC of the right mid brow-treated with Mohs by Dr. Kristel Campa on 7/16/2015. Basal cell carcinoma on the right nasal sidewall status post Mohs micrographic surgery with Dr. Kristel Campa on 7/14/2014  Basal cell carcinoma on the left temple status post Mohs micrographic surgery in March 2013. Review of Systems:  Gen: Feels well, good sense of health.  + for intentional weight loss. Past Medical History, Family History, Surgical History, Medications and Allergies reviewed. Past Medical History:   Diagnosis Date    Allergic rhinitis     Basal cell carcinoma (BCC) of right forehead 05/2021    NBCC, superficial, RT forehead    Hyperlipidemia     Squamous cell carcinoma July, 2014    Tk [place]     Past Surgical History:   Procedure Laterality Date    BLEPHAROPLASTY  *Nov.29, 2018    Dr. Mercedes Muñiz - right lower lid    COLONOSCOPY  May 31, 2013 ( 2023 )     - Normal    KNEE ARTHROPLASTY Right 02/2020    MOHS SURGERY  July, 2014    Dr. Amada De Leon - squamous cell carcinoma    MOHS SURGERY Right 10/25/2018    right nasal sidewall    SKIN BIOPSY      pre-cancerous       No Known Allergies  Outpatient Medications Marked as Taking for the 12/15/22 encounter (Office Visit) with Mallroy Aguilar MD   Medication Sig Dispense Refill    atorvastatin (LIPITOR) 20 MG tablet       Bioflavonoid Products (GRAPE SEED PO) Take by mouth      VITAMIN E PO Take by mouth      Cholecalciferol (VITAMIN D3 PO) Take by mouth      MAGNESIUM GLYCINATE PO Take by mouth      GLYCINE PO Take by mouth      NATTOKINASE PO Take by mouth      Coenzyme Q10 (CO Q 10 PO) Take by mouth      NONFORMULARY Lion's luis carlos,Natto-Serrazime, Homocysteine, Tegricel Colostrum, Quercitin      multivitamin (THERAGRAN) per tablet Take 1 tablet by mouth daily. Misc Natural Products (GLUCOSAMINE CHOND COMPLEX/MSM PO) Take  by mouth. FISH OIL Take  by mouth daily. Social History:  [employer])    Has 3 kids: 1 in town, 2 out of town. Youngest daughter had a baby in August.      Physical Examination       The following were examined and determined to be normal: Psych/Neuro, Conjunctivae/eyelids, Gums/teeth/lips, Neck, Breast/axilla/chest, Abdomen, Back, RUE, LUE, RLE, LLE, and Nails/digits. The following were examined and determined to be abnormal: Scalp/hair and Head/face. Well appearing. 1.  Left frontal scalp - 1, left upper forehead - 1, left nasal sidewall - 1: ill defined keratotic pink macules. 2.  Trunk and extremities with multiple well defined round to oval smooth brown macules and papules. Assessment and Plan     1. AK (actinic keratosis) - 3    Cryotherapy was discussed and patient agreed to proceed. Consent was obtained. 3 lesions were treated cryotherapy: Left frontal scalp - 1, left upper forehead - 1, left nasal sidewall - 1. 2 cycles of liquid nitrogen applied to each lesion for 5 seconds using a [employer] cryo spray gun.   Patient was educated regarding the potential risks of blister formation and discomfort. Wound care was discussed. The patient tolerated the procedure well and there were no immediate complications. 2. Multiple nevi - benign appearing    Sun protective behaviors, including use of at least SPF 30 sunscreen, and self skin examinations were encouraged. Call for any new or concerning lesions. Return in about 6 months (around 6/15/2023).     --Jaxson Clark MD

## 2023-09-11 ENCOUNTER — OFFICE VISIT (OUTPATIENT)
Dept: DERMATOLOGY | Age: 58
End: 2023-09-11
Payer: COMMERCIAL

## 2023-09-11 DIAGNOSIS — D22.9 MULTIPLE NEVI: ICD-10-CM

## 2023-09-11 DIAGNOSIS — Z85.828 HISTORY OF BASAL CELL CARCINOMA: ICD-10-CM

## 2023-09-11 DIAGNOSIS — L57.0 AK (ACTINIC KERATOSIS): Primary | ICD-10-CM

## 2023-09-11 DIAGNOSIS — L82.1 SK (SEBORRHEIC KERATOSIS): ICD-10-CM

## 2023-09-11 PROCEDURE — 99213 OFFICE O/P EST LOW 20 MIN: CPT | Performed by: DERMATOLOGY

## 2023-09-11 NOTE — PROGRESS NOTES
[facility]  Gary Barragan MD  [phone]      Moriah Mireles  1965    59 y.o. male     Date of Visit: 9/11/2023    Chief Complaint: skin lesions    History of Present Illness:    1. He reports a 1 month history of asymptomatic discoloration on the bridge of the nose. 2.  He also presents today for evaluation of multiple moles - not aware of any changes in size, color or shape. 3.  He has a history of BCCs - denies any signs of recurrence. 4.  He reports a couple of asymptomatic growths on the left forearm and shin. Dermatologic history:     Superficial nodular BCC of the right forehead-treated with Mohs by Dr. Sheridan Garcia on 5/11/2021. Small nodular BCC on the left upper back-treated with curettage at the time of biopsy on 3/28/2019. Nodular BCC on the right lower eyelid-treated with Mohs by Dr. Sheridan Garcia and repair by Dr. Jas Carlson in November 2018. Nodular BCC of the right nasal sidewall-treated with Mohs by Dr. Sheridan Garcia on 10/25/2018. Small nodular BCC of the right postauricular region-treated with electrodesiccation and curettage on 1/15/2016. BCC of the right mid brow-treated with Mohs by Dr. Sheridan Garcia on 7/16/2015. Basal cell carcinoma on the right nasal sidewall status post Mohs micrographic surgery with Dr. Sheridan Garcia on 7/14/2014  Basal cell carcinoma on the left temple status post Mohs micrographic surgery in March 2013. Review of Systems:  Gen: Feels well, good sense of health. Skin: No new or changing moles. Past Medical History, Family History, Surgical History, Medications and Allergies reviewed. Past Medical History:   Diagnosis Date    Allergic rhinitis     Basal cell carcinoma (BCC) of right forehead 05/2021    NBCC, superficial, RT forehead    Hyperlipidemia     Squamous cell carcinoma July, 2014    Tk Harris     Past Surgical History:   Procedure Laterality Date    BLEPHAROPLASTY  *Nov.29, 2018    Dr. Florin Donald - right lower lid

## 2024-03-07 ENCOUNTER — TELEPHONE (OUTPATIENT)
Dept: DERMATOLOGY | Age: 59
End: 2024-03-07

## 2024-03-07 NOTE — TELEPHONE ENCOUNTER
Pt canceled 6 month check for 3/13. Re-scheduled for next available (7/30). Pt states he has no current skin issues so the July date is okay with pt if it's okay with Dr. Munguia.    If he needs to schedule sooner, please call at [phone].

## 2024-03-07 NOTE — TELEPHONE ENCOUNTER
Advised patient that it is okay to wait until July but to call us with any new lesions/concerns.  He verbalized understanding.

## 2024-07-30 ENCOUNTER — OFFICE VISIT (OUTPATIENT)
Dept: DERMATOLOGY | Age: 59
End: 2024-07-30
Payer: COMMERCIAL

## 2024-07-30 DIAGNOSIS — Z85.828 HISTORY OF BASAL CELL CARCINOMA: ICD-10-CM

## 2024-07-30 DIAGNOSIS — L57.0 AK (ACTINIC KERATOSIS): Primary | ICD-10-CM

## 2024-07-30 DIAGNOSIS — D22.9 MULTIPLE NEVI: ICD-10-CM

## 2024-07-30 PROCEDURE — 99213 OFFICE O/P EST LOW 20 MIN: CPT | Performed by: DERMATOLOGY

## 2024-07-30 PROCEDURE — 17000 DESTRUCT PREMALG LESION: CPT | Performed by: DERMATOLOGY

## 2024-07-30 PROCEDURE — 17003 DESTRUCT PREMALG LES 2-14: CPT | Performed by: DERMATOLOGY

## 2024-07-30 RX ORDER — MILK THISTLE 150 MG
1 CAPSULE ORAL DAILY
COMMUNITY

## 2024-07-30 RX ORDER — ASPIRIN 81 MG/1
81 TABLET ORAL
COMMUNITY
Start: 2024-04-02

## 2024-07-30 RX ORDER — EZETIMIBE 10 MG/1
10 TABLET ORAL DAILY
COMMUNITY
Start: 2024-03-18

## 2024-07-30 RX ORDER — PANTOPRAZOLE SODIUM 40 MG/1
TABLET, DELAYED RELEASE ORAL
COMMUNITY
Start: 2024-06-20

## 2024-07-30 NOTE — PROGRESS NOTES
[facility]  Gautam Munguia MD  [phone]      Evangelista Cortés  1965    59 y.o. male     Date of Visit: 7/30/2024    Chief Complaint: skin lesions    History of Present Illness:    1.  He has several persistent scaly lesions on the scalp and face.     2.  He presents today for evaluation of multiple moles - not aware of any changes in size, color or shape.      3.  He has a history of basal cell carcinomas-denies any signs of recurrence.    Dermatologic history:     Superficial nodular BCC of the right forehead-treated with Mohs by Dr. Arriola on 5/11/2021.  Small nodular BCC on the left upper back-treated with curettage at the time of biopsy on 3/28/2019.  Nodular BCC on the right lower eyelid-treated with Mohs by Dr. Arriola and repair by Dr. Li in November 2018.  Nodular BCC of the right nasal sidewall-treated with Mohs by Dr. Arriola on 10/25/2018.  Small nodular BCC of the right postauricular region-treated with electrodesiccation and curettage on 1/15/2016.  BCC of the right mid brow-treated with Mohs by Dr. Arriola on 7/16/2015.  Basal cell carcinoma on the right nasal sidewall status post Mohs micrographic surgery with Dr. Arriola on 7/14/2014  Basal cell carcinoma on the left temple status post Mohs micrographic surgery in March 2013.       Retired at the beginning of May.      Review of Systems:  Gen: Feels well, good sense of health.      Past Medical History, Family History, Surgical History, Medications and Allergies reviewed.    Past Medical History:   Diagnosis Date    Allergic rhinitis     Basal cell carcinoma (BCC) of right forehead 05/2021    NBCC, superficial, RT forehead    Hyperlipidemia     Squamous cell carcinoma July, 2014    Tk Munguia and Flako     Past Surgical History:   Procedure Laterality Date    BLEPHAROPLASTY  *Nov.29, 2018    Dr. Lona Li - right lower lid    COLONOSCOPY  May 31, 2013 ( 2023 )     - Normal    KNEE ARTHROPLASTY Right

## 2024-09-23 ENCOUNTER — PATIENT MESSAGE (OUTPATIENT)
Dept: DERMATOLOGY | Age: 59
End: 2024-09-23

## 2024-10-07 ENCOUNTER — OFFICE VISIT (OUTPATIENT)
Dept: DERMATOLOGY | Age: 59
End: 2024-10-07
Payer: COMMERCIAL

## 2024-10-07 DIAGNOSIS — L82.1 SK (SEBORRHEIC KERATOSIS): Primary | ICD-10-CM

## 2024-10-07 PROCEDURE — 99212 OFFICE O/P EST SF 10 MIN: CPT | Performed by: DERMATOLOGY

## 2024-10-07 NOTE — PROGRESS NOTES
[facility]  Gautam Munguia MD  [phone]      Evangelista Cortés  1965    59 y.o. male     Date of Visit: 10/7/2024    Chief Complaint: skin lesion    History of Present Illness:    1.  He presents today for a newly noted lesion on the left temple.  He also reports a raised lesion below that.    Dermatologic history:     Superficial nodular BCC of the right forehead-treated with Mohs by Dr. Arriola on 5/11/2021.  Small nodular BCC on the left upper back-treated with curettage at the time of biopsy on 3/28/2019.  Nodular BCC on the right lower eyelid-treated with Mohs by Dr. Arriola and repair by Dr. Li in November 2018.  Nodular BCC of the right nasal sidewall-treated with Mohs by Dr. Arriola on 10/25/2018.  Small nodular BCC of the right postauricular region-treated with electrodesiccation and curettage on 1/15/2016.  BCC of the right mid brow-treated with Mohs by Dr. Arriola on 7/16/2015.  Basal cell carcinoma on the right nasal sidewall status post Mohs micrographic surgery with Dr. Arriola on 7/14/2014  Basal cell carcinoma on the left temple status post Mohs micrographic surgery in March 2013.        Retired at the beginning of May.      Review of Systems:  Gen: Feels well, good sense of health.    Past Medical History, Family History, Surgical History, Medications and Allergies reviewed.    Past Medical History:   Diagnosis Date    Allergic rhinitis     Basal cell carcinoma (BCC) of right forehead 05/2021    NBCC, superficial, RT forehead    Hyperlipidemia     Squamous cell carcinoma July, 2014    Tk [place]     Past Surgical History:   Procedure Laterality Date    BLEPHAROPLASTY  *Nov.29, 2018    Dr. Lona Li - right lower lid    COLONOSCOPY  May 31, 2013 ( 2023 )     - Normal    KNEE ARTHROPLASTY Right 02/2020    MOHS SURGERY  July, 2014    Dr. Shelley Arriola - squamous cell carcinoma    MOHS SURGERY Right 10/25/2018    right nasal sidewall    SKIN BIOPSY

## 2025-01-30 ENCOUNTER — OFFICE VISIT (OUTPATIENT)
Age: 60
End: 2025-01-30
Payer: COMMERCIAL

## 2025-01-30 DIAGNOSIS — Z85.828 HISTORY OF BASAL CELL CARCINOMA: ICD-10-CM

## 2025-01-30 DIAGNOSIS — L81.4 SOLAR LENTIGINOSIS: Primary | ICD-10-CM

## 2025-01-30 DIAGNOSIS — D22.9 MULTIPLE NEVI: ICD-10-CM

## 2025-01-30 DIAGNOSIS — L11.1 GROVER'S DISEASE: ICD-10-CM

## 2025-01-30 PROCEDURE — 99213 OFFICE O/P EST LOW 20 MIN: CPT | Performed by: DERMATOLOGY

## 2025-07-30 ENCOUNTER — OFFICE VISIT (OUTPATIENT)
Age: 60
End: 2025-07-30

## 2025-07-30 DIAGNOSIS — D48.5 NEOPLASM OF UNCERTAIN BEHAVIOR OF SKIN: Primary | ICD-10-CM

## 2025-07-30 DIAGNOSIS — Z85.828 HISTORY OF BASAL CELL CARCINOMA: ICD-10-CM

## 2025-07-30 DIAGNOSIS — D22.9 MULTIPLE NEVI: ICD-10-CM

## 2025-07-30 NOTE — PATIENT INSTRUCTIONS
Biopsy Wound Care Instructions    Keep the bandage in place for 24 hours.   Cleanse the wound with mild soapy water daily  Gently dry the area.  Apply Vaseline or petroleum jelly to the wound using a cotton tipped applicator.  Cover with a clean bandage.  Repeat this process until the biopsy site is healed.  If you had stitches placed, continue treating the site until the stitches are removed. Remember to make an appointment to return to have your stitches removed by our staff.  You may shower and bathe as usual.       ** Biopsy results generally take around 7 business days to come back.  If you have not heard from us by then, please call the office at [phone].    *Please note that biopsy results are released to both the patient and physician at the same time in [facility].  Please allow time for your physician to review the results.  One of our staff members will reach out to you with the results and plan.

## 2025-07-30 NOTE — PROGRESS NOTES
- Normal    KNEE ARTHROPLASTY Right 02/2020    MOHS SURGERY  July, 2014    Dr. Shelley Arriola - squamous cell carcinoma    MOHS SURGERY Right 10/25/2018    right nasal sidewall    SKIN BIOPSY      pre-cancerous       No Known Allergies  No outpatient medications have been marked as taking for the 7/30/25 encounter (Office Visit) with Gautam Munguia MD.         Physical Examination     The following were examined and determined to be normal: Psych/Neuro, Scalp/hair, Conjunctivae/eyelids, Gums/teeth/lips, Neck, Breast/axilla/chest, Abdomen, Back, RUE, LUE, RLE, LLE, and Nails/digits.    The following were examined and determined to be abnormal: Head/face.        Well appearing.    1.    A.  Left proximal nasal dorsum with a 2 to 3 mm telangiectatic pearly papule.   B.  Right lateral cheek with a small telangiectatic pink papule.           2.  Trunk and extremities with multiple well defined round to oval smooth brown macules and papules.     3.  Clear.       Assessment and Plan     1. Neoplasm of uncertain behavior of skin,  A.  Left proximal nasal dorsum-rule out BCC  B.  Right lateral cheek-rule out BCC    Discussed possible diagnosis; patient agreeable to proceed with skin biopsies (written consent obtained).  Risks of the procedure were reviewed including discomfort, bleeding, scar and infection.      The areas to be biopsied were marked with a surgical pen.  Alcohol was used to cleanse the sites. Local anesthesia was acheived with 1% lidocaine with epinephrine. Shave biopsy was performed x 2 using a razor blade.  Hemostasis was achieved with aluminum chloride. The wounds were dressed with petrolatum and covered with a bandage.  Wound care instructions were reviewed.  2 Specimens sent to pathology.  The specimen bottles were appropriately labeled.      The patient tolerated the procedures well and there were no immediate complications.      2. Multiple nevi - benign appearing    Sun protective behaviors,

## 2025-08-04 LAB — DERMATOLOGY PATHOLOGY REPORT: ABNORMAL
